# Patient Record
Sex: MALE | Race: WHITE | NOT HISPANIC OR LATINO | Employment: FULL TIME | URBAN - METROPOLITAN AREA
[De-identification: names, ages, dates, MRNs, and addresses within clinical notes are randomized per-mention and may not be internally consistent; named-entity substitution may affect disease eponyms.]

---

## 2024-05-16 ENCOUNTER — OFFICE VISIT (OUTPATIENT)
Dept: FAMILY MEDICINE | Facility: CLINIC | Age: 49
End: 2024-05-16
Payer: COMMERCIAL

## 2024-05-16 VITALS
DIASTOLIC BLOOD PRESSURE: 85 MMHG | OXYGEN SATURATION: 96 % | TEMPERATURE: 97.9 F | SYSTOLIC BLOOD PRESSURE: 120 MMHG | RESPIRATION RATE: 17 BRPM | HEART RATE: 93 BPM | WEIGHT: 176 LBS

## 2024-05-16 DIAGNOSIS — J40 BRONCHITIS: ICD-10-CM

## 2024-05-16 DIAGNOSIS — J01.90 ACUTE NON-RECURRENT SINUSITIS, UNSPECIFIED LOCATION: Primary | ICD-10-CM

## 2024-05-16 PROCEDURE — 99203 OFFICE O/P NEW LOW 30 MIN: CPT | Performed by: FAMILY MEDICINE

## 2024-05-16 RX ORDER — DOXYCYCLINE HYCLATE 100 MG
100 TABLET ORAL 2 TIMES DAILY
Qty: 14 TABLET | Refills: 0 | Status: SHIPPED | OUTPATIENT
Start: 2024-05-16 | End: 2024-05-23

## 2024-05-16 NOTE — PROGRESS NOTES
(J01.90) Acute non-recurrent sinusitis, unspecified location  (primary encounter diagnosis)  Comment:   Plan: doxycycline hyclate (VIBRA-TABS) 100 MG tablet            (J40) Bronchitis  Comment:   Plan: doxycycline hyclate (VIBRA-TABS) 100 MG tablet            He will continue his Sudafed.        CHIEF COMPLAINT    Congestion and cough for 2 weeks.      HISTORY    This patient had an initial URI presentation about 2 weeks ago.  In the last week he has had a persistent cough with sputum production as well as sinus headaches and drainage.  Has not had fever.    He has been taking Sudafed and some 12-hour cough med.      REVIEW OF SYSTEMS    No ear pain.  No sore throat.  No wheezing or SOB.  No chest pain.  No nausea.  No swelling.      EXAM  /85   Pulse 93   Temp 97.9  F (36.6  C)   Resp 17   Wt 79.8 kg (176 lb)   SpO2 96%       Patient peers well in general.  TMs normal.  Pharynx not inflamed.  Minimally enlarged anterior cervical nodes.  Lungs occasional wheeze.